# Patient Record
Sex: MALE | Race: ASIAN | URBAN - METROPOLITAN AREA
[De-identification: names, ages, dates, MRNs, and addresses within clinical notes are randomized per-mention and may not be internally consistent; named-entity substitution may affect disease eponyms.]

---

## 2017-11-21 ENCOUNTER — ALLIED HEALTH/NURSE VISIT (OUTPATIENT)
Dept: NURSING | Facility: CLINIC | Age: 37
End: 2017-11-21

## 2017-11-21 ENCOUNTER — OFFICE VISIT (OUTPATIENT)
Dept: FAMILY MEDICINE | Facility: CLINIC | Age: 37
End: 2017-11-21
Payer: COMMERCIAL

## 2017-11-21 VITALS
SYSTOLIC BLOOD PRESSURE: 142 MMHG | TEMPERATURE: 99 F | BODY MASS INDEX: 23.78 KG/M2 | WEIGHT: 148 LBS | DIASTOLIC BLOOD PRESSURE: 72 MMHG | HEART RATE: 98 BPM | HEIGHT: 66 IN

## 2017-11-21 DIAGNOSIS — G51.0 BELL'S PALSY: Primary | ICD-10-CM

## 2017-11-21 PROCEDURE — 99204 OFFICE O/P NEW MOD 45 MIN: CPT | Performed by: FAMILY MEDICINE

## 2017-11-21 RX ORDER — PREDNISONE 20 MG/1
60 TABLET ORAL DAILY
Qty: 18 TABLET | Refills: 0 | Status: SHIPPED | OUTPATIENT
Start: 2017-11-21 | End: 2017-11-27

## 2017-11-21 NOTE — NURSING NOTE
Patient came walking into clinic today to schedule appointment and states that he is having a fuzzy feeling on the right side of his face.  States started Sat and Sunday with his mouth but now notes his eye is watery, sensitive to light.  Patient smile is uneven on right side with deficit.      Patient denies any headaches or weakness to one side.  Grasps were equal, denies any sensation changes from one side of face to the other as well as denies any taste changes.    Patient reports he had chicken pox last month and was seen at [facility] where he goes regularly.    Huddle with Dr. Cote and she will see patient.    Ruth Ann Lazo RN - Triage  [facility]

## 2017-11-21 NOTE — MR AVS SNAPSHOT
"              After Visit Summary   2017    Ignacio Acevedo    MRN: [id number]           Patient Information     Date Of Birth          1980        Visit Information        Provider Department      2017 5:30 PM EC RN [facility] [place]        Today's Diagnoses     Bell's palsy    -  1       Follow-ups after your visit        Who to contact     If you have questions or need follow up information about today's clinic visit or your schedule please contact [facility] [place] directly at [phone].  Normal or non-critical lab and imaging results will be communicated to you by [employer], letter or phone within 4 business days after the clinic has received the results. If you do not hear from us within 7 days, please contact the clinic through Fwd: Powert or phone. If you have a critical or abnormal lab result, we will notify you by phone as soon as possible.  Submit refill requests through Minube or call your pharmacy and they will forward the refill request to us. Please allow 3 business days for your refill to be completed.          Additional Information About Your Visit        MyChart Information     Minube lets you send messages to your doctor, view your test results, renew your prescriptions, schedule appointments and more. To sign up, go to [email or web address] . Click on \"Log in\" on the left side of the screen, which will take you to the Welcome page. Then click on \"Sign up Now\" on the right side of the page.     You will be asked to enter the access code listed below, as well as some personal information. Please follow the directions to create your username and password.     Your access code is: [email or web address]  Expires: 2018  6:06 PM     Your access code will  in 90 days. If you need help or a new code, please call your [facility] or [phone].        Care EveryWhere ID     This is your Care EveryWhere ID. This could be used by other organizations " to access your [facility] medical records  [id number]         Blood Pressure from Last 3 Encounters:   11/21/17 142/72    Weight from Last 3 Encounters:   11/21/17 148 lb (67.1 kg)              Today, you had the following     No orders found for display       Primary Care Provider    None Specified       No primary provider on file.        Equal Access to Services     SONIA [facility] : Hadii aad ku hadasho Soomaali, waaxda luqadaha, qaybta kaalmada adeoliveyada, phani almaguerdarleenjewel wooten . So [facility] [phone].    ATENCIÓN: Si habla español, tiene a steele disposición servicios gratuitos de asistencia lingüística. Llame al [phone].    We comply with applicable federal civil rights laws and [place] laws. We do not discriminate on the basis of race, color, national origin, age, disability, sex, sexual orientation, or gender identity.            Thank you!     [facility] you [facility] your [facility]. Our goal is always to provide you with excellent care. Hearing back from our patients is one way we can continue to improve our services. Please take a few minutes to complete the written survey that you may receive in the mail after your visit with us. Thank you!             Your Updated Medication List - Protect others around you: Learn how to safely use, store and throw away your medicines at www.disposemymeds.org.      Notice  As of 11/21/2017  6:06 PM    You have not been prescribed any medications.

## 2017-11-21 NOTE — PROGRESS NOTES
"  SUBJECTIVE:   Ignacio Acevedo is a 37 year old male who presents to clinic today for the following health issues:    Patient came walking into clinic today to schedule appointment and states that he is having a fuzzy feeling on the right side of his face.  States started Saturday and Sunday with his mouth but now notes his eye is watery, sensitive to light.  Patient smile is uneven on right side with deficit.   Unable to close his one eye on the right     Patient denies any headaches or weakness to one side.  Denies any sensation changes from one side of face to the other as well as denies any taste changes.     Patient reports he had chicken pox last month and was seen at [facility] where he goes regularly.      Problem list and histories reviewed & adjusted, as indicated.  Additional history: as documented    Patient Active Problem List   Diagnosis     NO ACTIVE PROBLEMS     History reviewed. No pertinent surgical history.    Social History   Substance Use Topics     Smoking status: Never Smoker     Smokeless tobacco: Never Used     Alcohol use Not on file     Family History   Problem Relation Age of Onset     Family History Negative Other          Current Outpatient Prescriptions   Medication Sig Dispense Refill     hypromellose (ARTIFICIAL TEARS) 0.4 % SOLN ophthalmic solution Apply 2 drops to eye every hour as needed for dry eyes 15 mL 0     Not on File      Reviewed and updated as needed this visit by clinical staffTobacco  Allergies  Meds  Med Hx  Surg Hx  Fam Hx  Soc Hx      Reviewed and updated as needed this visit by Provider  Tobacco  Med Hx  Surg Hx  Fam Hx  Soc Hx        ROS:   ROS: 10 point ROS neg other than the symptoms noted above in the HPI.      OBJECTIVE:                                                    /72 (BP Location: Left arm, Patient Position: Chair, Cuff Size: Adult Regular)  Pulse 98  Temp 99  F (37.2  C) (Tympanic)  Ht 5' 5.95\" (1.675 m)  Wt 148 lb (67.1 " kg)  BMI 23.93 kg/m2  Body mass index is 23.93 kg/(m^2).   GENERAL: healthy, alert, well nourished, well hydrated, no distress  EYES: Unable to close the right eye completely. Clear conjunctivae. Extraocular movements - intact, and PERRL.  HENT: ear canals- normal; TMs- normal; Nose- normal; Mouth- no ulcers, no lesions  NECK: no tenderness, no adenopathy, no asymmetry, no masses, no stiffness; thyroid- normal to palpation  RESP: lungs clear to auscultation - no rales, no rhonchi, no wheezes  CV: regular rates and rhythm, normal S1 S2, no S3 or S4 and no murmur, no click or rub -  ABDOMEN: soft, no tenderness, no  hepatosplenomegaly, no masses, normal bowel sounds  SKIN: no suspicious lesions, no rashes. No lesions noted in the ear canal bilaterally.  NEURO: Patient is unable to frown on the right half of the forehead. Right eye does not close completely. Patient has asymmetric smile.   strength and tone- normal, sensory exam- grossly normal, mentation- intact, speech- normal, reflexes- symmetric         ASSESSMENT/PLAN:                                                        ICD-10-CM    1. Bell's palsy G51.0 predniSONE (DELTASONE) 20 MG tablet     hypromellose (ARTIFICIAL TEARS) 0.4 % SOLN ophthalmic solution     Patient clinically presented today with Bell's palsy. Discussed the pathophysiology with the patient.  I do not see any rash therefore no antivirals were ordered. Prednisone ordered for the patient. Recommended to use artificial tears for eyes. May use tape to close the eye at night when sleeping. Symptoms may resolve over the next 3-6 weeks or longer.  Follow up with Provider - if symptoms are not improving in the next few days.     Delvin Cote MD  [facility]

## 2017-11-21 NOTE — MR AVS SNAPSHOT
After Visit Summary   11/21/2017    Ignacio Acevedo    MRN: [id number]           Patient Information     Date Of Birth          1980        Visit Information        Provider Department      11/21/2017 6:00 PM Delvin Cote MD [facility]        Today's Diagnoses     Bell's palsy    -  1      Care Instructions      Mccullough s Palsy    Bell's Palsy is a problem involving the nerve that controls the muscles on one side of the face.  The cause is unknown, but may be related to inflammation of the nerve. Symptoms usually appear only on the affected side. They may include:    Inability to close the eyelid    Tearing of the eye    Facial drooping    Drooling    Numbness or pain    Changes in taste    Sound sensitivity  Damage to the eye can be a serious problem. The inability to blink can cause the eye to dry out. An ulcer (sore) can then form on the cornea. Also, not blinking means that the eye has no protection from dirt and dust particles.  Treatment involves protecting and moistening the eye. Medicines may also help.  Most persons recover completely within 3 to 6 months. However, the condition sometimes returns months or years later.  Home care    Get plenty of rest and eat a healthy diet to help yourself recover.    Use Artificial Tears frequently during the day and at bedtime to prevent drying. These drops are available without prescription at your drug store.    Wear protective glasses especially when outside to protect from flying debris. Use sunglasses when outdoors.    Tape the eyelid closed at bedtime with a paper tape (available at your pharmacy). This has a very mild adhesive to avoid injury to the lid. This will protect your eye from injury while you sleep.    Sometimes medicines are prescribed to reduce inflammation or treat specific viral infections of the nerve. If medicines are prescribed, take them exactly as directed. Usually there is a 10-day course of medication  that is started as soon as possible. Taking this medicine as prescribed will help with a full recovery.    Use low heat, for example from a heating pad, on the affected area. This can help reduce pain and swelling.    If you are experiencing sever pain, contact your health care provider.  Follow-up care  Follow up with your healthcare provider as advised. If you referred to a specialist, make that appointment promptly.  When to seek medical advice  Call your healthcare provider if any of the following occur:    Severe eye redness    Eye pain    Thick drainage from the eye    Change in vision (such as double vision or losing vision)    Fever over 100.4 F (38 C) or as directed by your healthcare provider    Headache, neck pain, weakness, trouble speaking or walking, or other unexplained symptoms  Date Last Reviewed: 8/23/2015 2000-2017 [employer] [facility]. [address] [place], [place] [address]. All rights reserved. This information is not intended as a substitute for professional medical care. Always follow your healthcare professional's instructions.                Follow-ups after your visit        Follow-up notes from your care team     Return in about 5 days (around 11/26/2017), or if symptoms worsen or fail to improve.      Who to contact     If you have questions or need follow up information about today's clinic visit or your schedule please contact [facility] directly at [phone].  Normal or non-critical lab and imaging results will be communicated to you by MyChart, letter or phone within 4 business days after the clinic has received the results. If you do not hear from us within 7 days, please contact the clinic through MyChart or phone. If you have a critical or abnormal lab result, we will notify you by phone as soon as possible.  Submit refill requests through [employer] or call your pharmacy and they will forward the refill request to us. Please allow 3 business days for  "your refill to be completed.          Additional Information About Your Visit        [employer] Management[employer]riblio Information     [facility] lets you send messages to your doctor, view your test results, renew your prescriptions, schedule appointments and more. To sign up, go to [email or web address] . Click on \"Log in\" on the left side of the screen, which will take you to the Welcome page. Then click on \"Sign up Now\" on the right side of the page.     You will be asked to enter the access code listed below, as well as some personal information. Please follow the directions to create your username and password.     Your access code is: [email or web address]  Expires: 2018  6:06 PM     Your access code will  in 90 days. If you need help or a new code, please call your [place] clinic or [phone].        Care EveryWhere ID     This is your Care EveryWhere ID. This could be used by other organizations to access your [facility] medical records  [id number]        Your Vitals Were     Pulse Temperature Height BMI (Body Mass Index)          98 99  F (37.2  C) (Tympanic) 5' 5.95\" (1.675 m) 23.93 kg/m2         Blood Pressure from Last 3 Encounters:   17 142/72    Weight from Last 3 Encounters:   17 148 lb (67.1 kg)              Today, you had the following     No orders found for display         Today's Medication Changes          These changes are accurate as of: 17  6:18 PM.  If you have any questions, ask your nurse or doctor.               Start taking these medicines.        Dose/Directions    hypromellose 0.4 % Soln ophthalmic solution   Commonly known as:  ARTIFICIAL TEARS   Used for:  Bell's palsy   Started by:  Delvin Cote MD        Dose:  2 drop   Apply 2 drops to eye every hour as needed for dry eyes   Quantity:  15 mL   Refills:  0       predniSONE 20 MG tablet   Commonly known as:  DELTASONE   Used for:  Bell's palsy   Started by:  Delvin Cote MD        Dose:  60 mg   Take 3 tablets (60 mg) by mouth daily " for 6 days   Quantity:  18 tablet   Refills:  0            Where to get your medicines      These medications were sent to [facility] [place] - [place], [place] - [address]  [address] [facility] [address] [facility], [place] [address]     Phone:  [phone]     hypromellose 0.4 % Soln ophthalmic solution    predniSONE 20 MG tablet                Primary Care Provider    None Specified       No primary provider on file.        Equal Access to Services     JOSUÉ GARNER : Hadii aad ku hadasho Soomaali, waaxda luqadaha, qaybta kaalmada adeegyada, waxay idiin hayaan kiki almaguerdarleenjewel wooten . So [facility] [phone].    ATENCIÓN: Si habla espmegan, tiene a steele disposición servicios gratuitos de asistencia lingüística. Llame al [phone].    We comply with applicable federal civil rights laws and [place] laws. We do not discriminate on the basis of race, color, national origin, age, disability, sex, sexual orientation, or gender identity.            Thank you!     Thank you for choosing [facility] [place]  for your care. Our goal is always to provide you with excellent care. Hearing back from our patients is one way we can continue to improve our services. Please take a few minutes to complete the written survey that you may receive in the mail after your visit with us. Thank you!             Your Updated Medication List - Protect others around you: Learn how to safely use, store and throw away your medicines at www.disposemymeds.org.          This list is accurate as of: 11/21/17  6:18 PM.  Always use your most recent med list.                   Brand Name Dispense Instructions for use Diagnosis    hypromellose 0.4 % Soln ophthalmic solution    ARTIFICIAL TEARS    15 mL    Apply 2 drops to eye every hour as needed for dry eyes    Bell's palsy       predniSONE 20 MG tablet    DELTASONE    18 tablet    Take 3 tablets (60 mg) by mouth daily for 6 days    Bell's palsy

## 2017-11-22 NOTE — PATIENT INSTRUCTIONS
Mccullough s Palsy    Bell's Palsy is a problem involving the nerve that controls the muscles on one side of the face.  The cause is unknown, but may be related to inflammation of the nerve. Symptoms usually appear only on the affected side. They may include:    Inability to close the eyelid    Tearing of the eye    Facial drooping    Drooling    Numbness or pain    Changes in taste    Sound sensitivity  Damage to the eye can be a serious problem. The inability to blink can cause the eye to dry out. An ulcer (sore) can then form on the cornea. Also, not blinking means that the eye has no protection from dirt and dust particles.  Treatment involves protecting and moistening the eye. Medicines may also help.  Most persons recover completely within 3 to 6 months. However, the condition sometimes returns months or years later.  Home care    Get plenty of rest and eat a healthy diet to help yourself recover.    Use Artificial Tears frequently during the day and at bedtime to prevent drying. These drops are available without prescription at your drug store.    Wear protective glasses especially when outside to protect from flying debris. Use sunglasses when outdoors.    Tape the eyelid closed at bedtime with a paper tape (available at your pharmacy). This has a very mild adhesive to avoid injury to the lid. This will protect your eye from injury while you sleep.    Sometimes medicines are prescribed to reduce inflammation or treat specific viral infections of the nerve. If medicines are prescribed, take them exactly as directed. Usually there is a 10-day course of medication that is started as soon as possible. Taking this medicine as prescribed will help with a full recovery.    Use low heat, for example from a heating pad, on the affected area. This can help reduce pain and swelling.    If you are experiencing sever pain, contact your health care provider.  Follow-up care  Follow up with your healthcare provider as advised.  If you referred to a specialist, make that appointment promptly.  When to seek medical advice  Call your healthcare provider if any of the following occur:    Severe eye redness    Eye pain    Thick drainage from the eye    Change in vision (such as double vision or losing vision)    Fever over 100.4 F (38 C) or as directed by your healthcare provider    Headache, neck pain, weakness, trouble speaking or walking, or other unexplained symptoms  Date Last Reviewed: 8/23/2015 2000-2017 [employer]. [address] [place] [address]. All rights reserved. This information is not intended as a substitute for professional medical care. Always follow your healthcare professional's instructions.

## 2017-12-04 ENCOUNTER — OFFICE VISIT (OUTPATIENT)
Dept: FAMILY MEDICINE | Facility: CLINIC | Age: 37
End: 2017-12-04
Payer: COMMERCIAL

## 2017-12-04 VITALS
SYSTOLIC BLOOD PRESSURE: 134 MMHG | HEART RATE: 88 BPM | OXYGEN SATURATION: 100 % | WEIGHT: 147 LBS | TEMPERATURE: 98.9 F | RESPIRATION RATE: 16 BRPM | BODY MASS INDEX: 24.49 KG/M2 | HEIGHT: 65 IN | DIASTOLIC BLOOD PRESSURE: 68 MMHG

## 2017-12-04 DIAGNOSIS — G51.0 BELL'S PALSY: Primary | ICD-10-CM

## 2017-12-04 PROCEDURE — 99213 OFFICE O/P EST LOW 20 MIN: CPT | Performed by: FAMILY MEDICINE

## 2017-12-04 NOTE — MR AVS SNAPSHOT
"              After Visit Summary   2017    Ignacio Acevedo    MRN: [id number]           Patient Information     Date Of Birth          1980        Visit Information        Provider Department      2017 3:00 PM Delvin Cote MD [facility] [place]        Today's Diagnoses     Bell's palsy    -  1       Follow-ups after your visit        Who to contact     If you have questions or need follow up information about today's clinic visit or your schedule please contact [facility] [place] directly at [phone].  Normal or non-critical lab and imaging results will be communicated to you by Cloud[employer]hart, letter or phone within 4 business days after the clinic has received the results. If you do not hear from us within 7 days, please contact the clinic through CloudGenixhart or phone. If you have a critical or abnormal lab result, we will notify you by phone as soon as possible.  Submit refill requests through [employer] or call your pharmacy and they will forward the refill request to us. Please allow 3 business days for your refill to be completed.          Additional Information About Your Visit        MyChart Information     [employer] lets you send messages to your doctor, view your test results, renew your prescriptions, schedule appointments and more. To sign up, go to [email or web address] . Click on \"Log in\" on the left side of the screen, which will take you to the Welcome page. Then click on \"Sign up Now\" on the right side of the page.     You will be asked to enter the access code listed below, as well as some personal information. Please follow the directions to create your username and password.     Your access code is: [email or web address]  Expires: 2018  6:06 PM     Your access code will  in 90 days. If you need help or a new code, please call your [facility] or [phone].        Care EveryWhere ID     This is your Care EveryWhere ID. This could be used by other " "organizations to access your [place] medical records  [id number]        Your Vitals Were     Pulse Temperature Respirations Height Pulse Oximetry BMI (Body Mass Index)    88 98.9  F (37.2  C) 16 5' 5\" (1.651 m) 100% 24.46 kg/m2       Blood Pressure from Last 3 Encounters:   12/04/17 134/68   11/21/17 142/72    Weight from Last 3 Encounters:   12/04/17 147 lb (66.7 kg)   11/21/17 148 lb (67.1 kg)              Today, you had the following     No orders found for display       Primary Care Provider Office Phone # Fax #    [facility] [phone]       [address] [facility] [address]        Equal Access to Services     JOSUÉ GARNER : Hadii [place] blackmon hadasho Soomaali, waaxda luqadaha, qaybta kaalmada adeegyada, phani wooten . So [facility] [phone].    ATENCIÓN: Si habla español, tiene a steele disposición servicios gratuitos de asistencia lingüística. Llame al [phone].    We comply with applicable federal civil rights laws and [place] laws. We do not discriminate on the basis of race, color, national origin, age, disability, sex, sexual orientation, or gender identity.            Thank you!     Thank you for choosing [facility]  for your care. Our goal is always to provide you with excellent care. Hearing back from our patients is one way we can continue to improve our services. Please take a few minutes to complete the written survey that you may receive in the mail after your visit with us. Thank you!             Your Updated Medication List - Protect others around you: Learn how to safely use, store and throw away your medicines at www.disposemymeds.org.          This list is accurate as of: 12/4/17 11:59 PM.  Always use your most recent med list.                   Brand Name Dispense Instructions for use Diagnosis    hypromellose 0.4 % Soln ophthalmic solution    ARTIFICIAL TEARS    15 mL    Apply 2 drops to eye every hour " as needed for dry eyes    Bell's palsy

## 2017-12-04 NOTE — PROGRESS NOTES
"Chief Complaint   Patient presents with     RECHECK     Bell's Palsy       Initial /68  Pulse 88  Temp 98.9  F (37.2  C)  Resp 16  Ht 5' 5\" (1.651 m)  Wt 147 lb (66.7 kg)  SpO2 100%  BMI 24.46 kg/m2 Estimated body mass index is 24.46 kg/(m^2) as calculated from the following:    Height as of this encounter: 5' 5\" (1.651 m).    Weight as of this encounter: 147 lb (66.7 kg).  Medication Reconciliation: complete. JUDITH Garner LPN        SUBJECTIVE:   Ignacio Acevedo is a 37 year old male who presents to clinic today for the following health issues:      Concern - Follow up Bell's Palsy  Onset: seen 11/21/17    Description:   Finished rx - prednisone.       Progression of Symptoms:  Improving slowly.     Accompanying Signs & Symptoms : none    Previous history of similar problem:   no        Problem list and histories reviewed & adjusted, as indicated.  Additional history: as documented    Patient Active Problem List   Diagnosis     NO ACTIVE PROBLEMS     No past surgical history on file.    Social History   Substance Use Topics     Smoking status: Never Smoker     Smokeless tobacco: Never Used     Alcohol use Yes      Comment: 1/ week     Family History   Problem Relation Age of Onset     Family History Negative Other      Family History Negative Mother      Family History Negative Father          Current Outpatient Prescriptions   Medication Sig Dispense Refill     hypromellose (ARTIFICIAL TEARS) 0.4 % SOLN ophthalmic solution Apply 2 drops to eye every hour as needed for dry eyes 15 mL 0     No Known Allergies      Reviewed and updated as needed this visit by clinical staffTobacco  Allergies  Meds  Fam Hx  Soc Hx      Reviewed and updated as needed this visit by Provider         ROS:  C: NEGATIVE for fever, chills, change in weight  E/M: NEGATIVE for ear, mouth and throat problems  R: NEGATIVE for significant cough or SOB  CV: NEGATIVE for chest pain, palpitations or peripheral " "edema    OBJECTIVE:                                                    /68  Pulse 88  Temp 98.9  F (37.2  C)  Resp 16  Ht 5' 5\" (1.651 m)  Wt 147 lb (66.7 kg)  SpO2 100%  BMI 24.46 kg/m2  Body mass index is 24.46 kg/(m^2).     GENERAL: healthy, alert, well nourished, well hydrated, no distress  EYES: Patient is able to close the right eye completely now, as compared to last OV. Clear conjunctivae. Extraocular movements - intact, and PERRL.  HENT: ear canals- normal; TMs- normal; Nose- normal; Mouth- no ulcers, no lesions  NECK: no tenderness, no adenopathy, no asymmetry, no masses, no stiffness; thyroid- normal to palpation  RESP: lungs clear to auscultation - no rales, no rhonchi, no wheezes  CV: regular rates and rhythm, normal S1 S2, no S3 or S4 and no murmur, no click or rub -  ABDOMEN: soft, no tenderness, no  hepatosplenomegaly, no masses, normal bowel sounds  SKIN: no suspicious lesions, no rashes. No lesions noted in the ear canal bilaterally.  NEURO: Patient is unable to frown on the right half of the forehead. Patient has slightly asymmetric smile.   strength and tone- normal, sensory exam- grossly normal, mentation- intact, speech- normal, reflexes- symmetric      1. Bell's palsy      Patient gradually improving symptoms and signs of Bell's palsy. Patient has finished Prednisone.  Recommended to continue to use artificial tears for eyes.   Patient reassured. No further medication needed.   Follow up with Provider - as needed       Delvin Cote MD  [facility]      "